# Patient Record
Sex: MALE | Race: WHITE | NOT HISPANIC OR LATINO | Employment: FULL TIME | URBAN - METROPOLITAN AREA
[De-identification: names, ages, dates, MRNs, and addresses within clinical notes are randomized per-mention and may not be internally consistent; named-entity substitution may affect disease eponyms.]

---

## 2023-03-03 LAB — PROSTATE SPECIFIC AG (NG/ML) IN SER/PLAS: 0.43 NG/ML (ref 0–4)

## 2023-09-22 LAB
ALANINE AMINOTRANSFERASE (SGPT) (U/L) IN SER/PLAS: 15 U/L (ref 10–52)
ALBUMIN (G/DL) IN SER/PLAS: 4.9 G/DL (ref 3.4–5)
ALKALINE PHOSPHATASE (U/L) IN SER/PLAS: 60 U/L (ref 33–120)
ANION GAP IN SER/PLAS: 16 MMOL/L (ref 10–20)
ASPARTATE AMINOTRANSFERASE (SGOT) (U/L) IN SER/PLAS: 17 U/L (ref 9–39)
BILIRUBIN TOTAL (MG/DL) IN SER/PLAS: 0.5 MG/DL (ref 0–1.2)
CALCIDIOL (25 OH VITAMIN D3) (NG/ML) IN SER/PLAS: 38 NG/ML
CALCIUM (MG/DL) IN SER/PLAS: 10.3 MG/DL (ref 8.6–10.6)
CARBON DIOXIDE, TOTAL (MMOL/L) IN SER/PLAS: 29 MMOL/L (ref 21–32)
CHLORIDE (MMOL/L) IN SER/PLAS: 103 MMOL/L (ref 98–107)
CREATININE (MG/DL) IN SER/PLAS: 0.92 MG/DL (ref 0.5–1.3)
ESTIMATED AVERAGE GLUCOSE FOR HBA1C: 197 MG/DL
GFR MALE: >90 ML/MIN/1.73M2
GLUCOSE (MG/DL) IN SER/PLAS: 53 MG/DL (ref 74–99)
HEMOGLOBIN A1C/HEMOGLOBIN TOTAL IN BLOOD: 8.5 %
POTASSIUM (MMOL/L) IN SER/PLAS: 4.7 MMOL/L (ref 3.5–5.3)
PROTEIN TOTAL: 8.1 G/DL (ref 6.4–8.2)
SODIUM (MMOL/L) IN SER/PLAS: 143 MMOL/L (ref 136–145)
UREA NITROGEN (MG/DL) IN SER/PLAS: 13 MG/DL (ref 6–23)

## 2024-01-12 ENCOUNTER — LAB (OUTPATIENT)
Dept: LAB | Facility: LAB | Age: 54
End: 2024-01-12
Payer: COMMERCIAL

## 2024-01-12 ENCOUNTER — OFFICE VISIT (OUTPATIENT)
Dept: PRIMARY CARE | Facility: CLINIC | Age: 54
End: 2024-01-12
Payer: COMMERCIAL

## 2024-01-12 VITALS
BODY MASS INDEX: 21.73 KG/M2 | WEIGHT: 155.8 LBS | DIASTOLIC BLOOD PRESSURE: 70 MMHG | OXYGEN SATURATION: 100 % | HEART RATE: 88 BPM | SYSTOLIC BLOOD PRESSURE: 103 MMHG

## 2024-01-12 DIAGNOSIS — Z12.11 COLON CANCER SCREENING: ICD-10-CM

## 2024-01-12 DIAGNOSIS — E10.9 TYPE 1 DIABETES MELLITUS WITHOUT COMPLICATION (MULTI): ICD-10-CM

## 2024-01-12 DIAGNOSIS — E10.9 TYPE 1 DIABETES MELLITUS WITHOUT COMPLICATION (MULTI): Primary | ICD-10-CM

## 2024-01-12 PROCEDURE — 3074F SYST BP LT 130 MM HG: CPT | Performed by: NURSE PRACTITIONER

## 2024-01-12 PROCEDURE — 4004F PT TOBACCO SCREEN RCVD TLK: CPT | Performed by: NURSE PRACTITIONER

## 2024-01-12 PROCEDURE — 83036 HEMOGLOBIN GLYCOSYLATED A1C: CPT

## 2024-01-12 PROCEDURE — 3078F DIAST BP <80 MM HG: CPT | Performed by: NURSE PRACTITIONER

## 2024-01-12 PROCEDURE — 80048 BASIC METABOLIC PNL TOTAL CA: CPT

## 2024-01-12 PROCEDURE — 36415 COLL VENOUS BLD VENIPUNCTURE: CPT

## 2024-01-12 PROCEDURE — 99214 OFFICE O/P EST MOD 30 MIN: CPT | Performed by: NURSE PRACTITIONER

## 2024-01-12 RX ORDER — INSULIN DEGLUDEC 100 U/ML
22 INJECTION, SOLUTION SUBCUTANEOUS EVERY MORNING
COMMUNITY
End: 2024-01-12 | Stop reason: SDUPTHER

## 2024-01-12 RX ORDER — PEN NEEDLE, DIABETIC 31 GX5/16"
NEEDLE, DISPOSABLE MISCELLANEOUS
COMMUNITY
Start: 2023-08-23 | End: 2024-01-12 | Stop reason: SDUPTHER

## 2024-01-12 RX ORDER — INSULIN DEGLUDEC 100 U/ML
22 INJECTION, SOLUTION SUBCUTANEOUS EVERY MORNING
Qty: 30 ML | Refills: 2 | Status: SHIPPED | OUTPATIENT
Start: 2024-01-12 | End: 2025-01-11

## 2024-01-12 RX ORDER — FLASH GLUCOSE SCANNING READER
EACH MISCELLANEOUS
COMMUNITY
Start: 2023-03-03

## 2024-01-12 RX ORDER — FLASH GLUCOSE SENSOR
KIT MISCELLANEOUS
COMMUNITY
Start: 2023-12-09 | End: 2024-01-12 | Stop reason: SDUPTHER

## 2024-01-12 RX ORDER — INSULIN LISPRO 100 [IU]/ML
INJECTION, SOLUTION INTRAVENOUS; SUBCUTANEOUS
COMMUNITY
Start: 2023-09-28 | End: 2024-01-12 | Stop reason: SDUPTHER

## 2024-01-12 RX ORDER — FLASH GLUCOSE SENSOR
KIT MISCELLANEOUS
Qty: 6 EACH | Refills: 3 | Status: SHIPPED | OUTPATIENT
Start: 2024-01-12

## 2024-01-12 RX ORDER — INSULIN LISPRO 100 [IU]/ML
7 INJECTION, SOLUTION INTRAVENOUS; SUBCUTANEOUS
Qty: 30 ML | Refills: 2 | Status: SHIPPED | OUTPATIENT
Start: 2024-01-12 | End: 2025-01-11

## 2024-01-12 RX ORDER — PEN NEEDLE, DIABETIC 31 GX5/16"
NEEDLE, DISPOSABLE MISCELLANEOUS
Qty: 400 EACH | Refills: 3 | Status: SHIPPED | OUTPATIENT
Start: 2024-01-12

## 2024-01-12 ASSESSMENT — ENCOUNTER SYMPTOMS
FREQUENCY: 0
DYSURIA: 0
ARTHRALGIAS: 0
NUMBNESS: 0
SHORTNESS OF BREATH: 0
ABDOMINAL DISTENTION: 0
HEMATURIA: 0
EYE PAIN: 0
COUGH: 0
ACTIVITY CHANGE: 0
APPETITE CHANGE: 0
PALPITATIONS: 0
VOMITING: 0
EYE ITCHING: 0
WOUND: 0
WHEEZING: 0
CONSTIPATION: 0
NERVOUS/ANXIOUS: 0
DIARRHEA: 0
ABDOMINAL PAIN: 0

## 2024-01-12 NOTE — ASSESSMENT & PLAN NOTE
-poor control   -advised to keep routine in mealtimes and insulin dosing.  -Continue Tresiba 22 units daily, advised to take Humalog 3 units Premeal consistently if blood sugar greater than 150 Premeal + ssi  -Advised to take insulin Premeal, avoid chasing postprandial blood sugar results   Glucose monitoring: Claudia 2 CGM- BS in AM 80s, after breakfast spikes x 2 hrs - then drops again.   A1c: 2/8/2023 was 8.7 improvement from June 2022 when it was 9.4  Ophthalmologist: every year dr hendricks at [place]- last visit 8 months ago.   Podiatrist: some neuropathy- feel cold at times. uses diabetic socks. keeps feet protected. Does not follow w podiatrist.     a1c, bmp, today  Hypoglycemia: seldom now. But does occur when he takes the dinner humalog   -reviewed recommendation for acei and statin-- declines at this time

## 2024-01-12 NOTE — PROGRESS NOTES
Chief Complaint  Blood Sugar Problem and Diabetes.    History Of Present Illness  Mitul Theodore is a 53 y.o. male presents today for follow up of Blood Sugar Problem and Diabetes.    Diabetes type 1  -poor control   -advised to keep routine in mealtimes and insulin dosing.  -Continue Tresiba 22 units daily, advised to take Humalog 3 units Premeal consistently if blood sugar greater than 150 Premeal + ssi  -Advised to take insulin Premeal, avoid chasing postprandial blood sugar results   Glucose monitoring: Claudia 2 CGM- BS in AM 80s, after breakfast spikes x 2 hrs - then drops again.   A1c: 2/8/2023 was 8.7 improvement from June 2022 when it was 9.4  Ophthalmologist: every year dr hendricks at [place]- last visit 8 months ago.   Podiatrist: some neuropathy- feel cold at times. uses diabetic socks. keeps feet protected. Does not follow w podiatrist.     a1c, bmp, today  Hypoglycemia: seldom now. But does occur when he takes the dinner humalog     Difficult to obtain clear of how he does insulin at home as he decides insulin usage daily based on glucose values.  At times takes dinner insulin, other times does not.        Review of Systems  Review of Systems   Constitutional:  Negative for activity change and appetite change.   HENT:  Negative for congestion.    Eyes:  Negative for pain and itching.   Respiratory:  Negative for cough, shortness of breath and wheezing.    Cardiovascular:  Negative for chest pain, palpitations and leg swelling.   Gastrointestinal:  Negative for abdominal distention, abdominal pain, constipation, diarrhea and vomiting.   Genitourinary:  Negative for dysuria, frequency, hematuria and urgency.   Musculoskeletal:  Negative for arthralgias and gait problem.   Skin:  Negative for rash and wound.   Neurological:  Negative for numbness.   Psychiatric/Behavioral:  The patient is not nervous/anxious.        Past Medical History  He has a past medical history of Abnormal weight loss (08/23/2013) and  "Acute sinusitis, unspecified (08/23/2013).    Surgical History  He has no past surgical history on file.    Family History  No family history on file.     Social History  He reports that he has been smoking cigarettes. He has been smoking an average of 1 pack per day. He has never used smokeless tobacco. He reports current alcohol use of about 4.0 standard drinks of alcohol per week. He reports that he does not use drugs.    Allergies  Patient has no known allergies.    Medications  Current Outpatient Medications   Medication Instructions    BD Ultra-Fine Li Pen Needle 32 gauge x 5/32\" needle USE 4 A DAY    FreeStyle Claudia 2 [place] misc USE AS DIRECTED    FreeStyle Claudia 2 Sensor kit USE 1 EVERY 14 DAYS    HumaLOG KwikPen Insulin 100 unit/mL injection USES 20 UNITS DAILY OR AMOUNT DIRECTED    Tresiba FlexTouch U-100 100 unit/mL (3 mL) injection Inject 22 Units under the skin once daily in the morning.        Objective   /70   Pulse 88   Wt 70.7 kg (155 lb 12.8 oz)   SpO2 100%   BMI 21.73 kg/m²    BMI: Estimated body mass index is 21.73 kg/m² as calculated from the following:    Height as of 6/7/23: 1.803 m (5' 11\").    Weight as of this encounter: 70.7 kg (155 lb 12.8 oz).    Physical Exam  Physical Exam  Vitals and nursing note reviewed.   Constitutional:       Appearance: Normal appearance.   HENT:      Head: Normocephalic and atraumatic.      Nose: Nose normal.      Mouth/Throat:      Mouth: Mucous membranes are moist.      Pharynx: Oropharynx is clear.   Eyes:      Extraocular Movements: Extraocular movements intact.      Conjunctiva/sclera: Conjunctivae normal.      Pupils: Pupils are equal, round, and reactive to light.   Cardiovascular:      Rate and Rhythm: Normal rate and regular rhythm.      Pulses: Normal pulses.      Heart sounds: Normal heart sounds.   Pulmonary:      Effort: Pulmonary effort is normal.      Breath sounds: Normal breath sounds.   Abdominal:      General: Bowel sounds are " normal.      Palpations: Abdomen is soft.      Tenderness: There is no abdominal tenderness.   Musculoskeletal:         General: Normal range of motion.      Cervical back: Neck supple.   Skin:     General: Skin is warm and dry.   Neurological:      General: No focal deficit present.      Mental Status: He is alert and oriented to person, place, and time. Mental status is at baseline.   Psychiatric:         Mood and Affect: Mood normal.         Behavior: Behavior normal.         Thought Content: Thought content normal.         Judgment: Judgment normal.         Relevant Results and Imaging  Orders Only on 09/22/2023   Component Date Value Ref Range Status    Glucose 09/22/2023 53 (L)  74 - 99 mg/dL Final    Sodium 09/22/2023 143  136 - 145 mmol/L Final    Potassium 09/22/2023 4.7  3.5 - 5.3 mmol/L Final    Chloride 09/22/2023 103  98 - 107 mmol/L Final    Bicarbonate 09/22/2023 29  21 - 32 mmol/L Final    Anion Gap 09/22/2023 16  10 - 20 mmol/L Final    Urea Nitrogen 09/22/2023 13  6 - 23 mg/dL Final    Creatinine 09/22/2023 0.92  0.50 - 1.30 mg/dL Final    GFR MALE 09/22/2023 >90  >90 mL/min/1.73m2 Final    Comment:  CALCULATIONS OF ESTIMATED GFR ARE PERFORMED   USING THE 2021 CKD-EPI STUDY REFIT EQUATION   WITHOUT THE RACE VARIABLE FOR THE IDMS-TRACEABLE   CREATININE METHODS.    https://jasn.asnjournals.org/content/early/2021/09/22/ASN.1963065088      Calcium 09/22/2023 10.3  8.6 - 10.6 mg/dL Final    Albumin 09/22/2023 4.9  3.4 - 5.0 g/dL Final    Alkaline Phosphatase 09/22/2023 60  33 - 120 U/L Final    Total Protein 09/22/2023 8.1  6.4 - 8.2 g/dL Final    AST 09/22/2023 17  9 - 39 U/L Final    Total Bilirubin 09/22/2023 0.5  0.0 - 1.2 mg/dL Final    ALT (SGPT) 09/22/2023 15  10 - 52 U/L Final    Comment:  Patients treated with Sulfasalazine may generate    falsely decreased results for ALT.      Hemoglobin A1C 09/22/2023 8.5 (A)  % Final    Comment:      Diagnosis of Diabetes-Adults   Non-Diabetic: < or =  5.6%   Increased risk for developing diabetes: 5.7-6.4%   Diagnostic of diabetes: > or = 6.5%  .       Monitoring of Diabetes                Age (y)     Therapeutic Goal (%)   Adults:          >18           <7.0   Pediatrics:    13-18           <7.5                   7-12           <8.0                   0- 6            7.5-8.5   American Diabetes Association. Diabetes Care 33(S1), Jan 2010.      Estimated Average Glucose 09/22/2023 197  MG/DL Final    Vitamin D, 25-Hydroxy 09/22/2023 38  ng/mL Final    Comment: .  DEFICIENCY:         < 20   NG/ML  INSUFFICIENCY:      20-29  NG/ML  SUFFICIENCY:         NG/ML    THIS ASSAY ACCURATELY QUANTIFIES THE SUM OF  VITAMIN D3, 25-HYDROXY AND VIT D2,25-HYDROXY.     Orders Only on 03/03/2023   Component Date Value Ref Range Status    PSA 03/03/2023 0.43  0.00 - 4.00 ng/mL Final    Comment: The FDA requires that the method used for PSA assay be   reported to the physician. Values obtained with different   assay methods must not be used interchangeably. This test   was performed at [facility] using the Siemens  Full Circle CRM PSA method, which is a sandwich immunoassay using   chemiluminescence for quantitation. The assay is approved  for measurement of prostate-specific antigen (PSA) in   serum and may be used in conjunction with a digital rectal  examination in men 50 years and older as an aid in   detection of prostate cancer.   5-Alpha-reductase inhibitors (e.g. Proscar, Finasteride,   Avodart, Dutasteride and Angelina) for the treatment of BPH   have been shown to lower PSA levels by an average of 50%   after 6 months of treatment.     [facility] Encounter on 02/08/2023   Component Date Value Ref Range Status    Hemoglobin A1C 02/08/2023 8.7 (A)  % Final    Comment:      Diagnosis of Diabetes-Adults   Non-Diabetic: < or = 5.6%   Increased risk for developing diabetes: 5.7-6.4%   Diagnostic of diabetes: > or = 6.5%  .       Monitoring of Diabetes                Age  (y)     Therapeutic Goal (%)   Adults:          >18           <7.0   Pediatrics:    13-18           <7.5                   7-12           <8.0                   0- 6            7.5-8.5   American Diabetes Association. Diabetes Care 33(S1), Jan 2010.      Estimated Average Glucose 02/08/2023 203  MG/DL Final    Cholesterol 02/08/2023 157  0 - 199 mg/dL Final    Comment: .      AGE      DESIRABLE   BORDERLINE HIGH   HIGH     0-19 Y     0 - 169       170 - 199     >/= 200    20-24 Y     0 - 189       190 - 224     >/= 225         >24 Y     0 - 199       200 - 239     >/= 240   **All ranges are based on fasting samples. Specific   therapeutic targets will vary based on patient-specific   cardiac risk.  .   Pediatric guidelines reference:Pediatrics 2011, 128(S5).   Adult guidelines reference: NCEP ATPIII Guidelines,     CHU 2001, 258:2486-97  .   Venipuncture immediately after or during the    administration of Metamizole may lead to falsely   low results. Testing should be performed immediately   prior to Metamizole dosing.      HDL 02/08/2023 49.6  mg/dL Final    Comment: .      AGE      VERY LOW   LOW     NORMAL    HIGH       0-19 Y       < 35   < 40     40-45     ----    20-24 Y       ----   < 40       >45     ----      >24 Y       ----   < 40     40-60      >60  .      Cholesterol/HDL Ratio 02/08/2023 3.2   Final    Comment: REF VALUES  DESIRABLE  < 3.4  HIGH RISK  > 5.0      LDL 02/08/2023 87  0 - 99 mg/dL Final    Comment: .                           NEAR      BORD      AGE      DESIRABLE  OPTIMAL    HIGH     HIGH     VERY HIGH     0-19 Y     0 - 109     ---    110-129   >/= 130     ----    20-24 Y     0 - 119     ---    120-159   >/= 160     ----      >24 Y     0 -  99   100-129  130-159   160-189     >/=190  .      VLDL 02/08/2023 20  0 - 40 mg/dL Final    Triglycerides 02/08/2023 102  0 - 149 mg/dL Final    Comment: .      AGE      DESIRABLE   BORDERLINE HIGH   HIGH     VERY HIGH   0 D-90 D    19 - 174          ----         ----        ----  91 D- 9 Y     0 -  74        75 -  99     >/= 100      ----    10-19 Y     0 -  89        90 - 129     >/= 130      ----    20-24 Y     0 - 114       115 - 149     >/= 150      ----         >24 Y     0 - 149       150 - 199    200- 499    >/= 500  .   Venipuncture immediately after or during the    administration of Metamizole may lead to falsely   low results. Testing should be performed immediately   prior to Metamizole dosing.      Glucose 02/08/2023 146 (H)  74 - 99 mg/dL Final    Sodium 02/08/2023 139  136 - 145 mmol/L Final    Potassium 02/08/2023 5.2  3.5 - 5.3 mmol/L Final    Chloride 02/08/2023 101  98 - 107 mmol/L Final    Bicarbonate 02/08/2023 32  21 - 32 mmol/L Final    Anion Gap 02/08/2023 11  10 - 20 mmol/L Final    Urea Nitrogen 02/08/2023 14  6 - 23 mg/dL Final    Creatinine 02/08/2023 0.81  0.50 - 1.30 mg/dL Final    GFR MALE 02/08/2023 >90  >90 mL/min/1.73m2 Final    Comment:  CALCULATIONS OF ESTIMATED GFR ARE PERFORMED   USING THE 2021 CKD-EPI STUDY REFIT EQUATION   WITHOUT THE RACE VARIABLE FOR THE IDMS-TRACEABLE   CREATININE METHODS.    https://jasn.asnjournals.org/content/early/2021/09/22/ASN.8875712185      Calcium 02/08/2023 9.5  8.6 - 10.6 mg/dL Final     No images are attached to the encounter.        Assessment and Plan  Assessment/Plan   Problem List Items Addressed This Visit             ICD-10-CM    Type 1 diabetes mellitus (CMS/[facility]) - Primary E10.9     -poor control   -advised to keep routine in mealtimes and insulin dosing.  -Continue Tresiba 22 units daily, advised to take Humalog 3 units Premeal consistently if blood sugar greater than 150 Premeal + ssi  -Advised to take insulin Premeal, avoid chasing postprandial blood sugar results   Glucose monitoring: Claudia 2 CGM- BS in AM 80s, after breakfast spikes x 2 hrs - then drops again.   A1c: 2/8/2023 was 8.7 improvement from June 2022 when it was 9.4  Ophthalmologist: every year dr hendricks at [place]-  last visit 8 months ago.   Podiatrist: some neuropathy- feel cold at times. uses diabetic socks. keeps feet protected. Does not follow w podiatrist.     a1c, bmp, today  Hypoglycemia: seldom now. But does occur when he takes the dinner humalog

## 2024-01-13 LAB
ANION GAP SERPL CALC-SCNC: 12 MMOL/L (ref 10–20)
BUN SERPL-MCNC: 13 MG/DL (ref 6–23)
CALCIUM SERPL-MCNC: 9.7 MG/DL (ref 8.6–10.6)
CHLORIDE SERPL-SCNC: 102 MMOL/L (ref 98–107)
CO2 SERPL-SCNC: 32 MMOL/L (ref 21–32)
CREAT SERPL-MCNC: 0.85 MG/DL (ref 0.5–1.3)
EGFRCR SERPLBLD CKD-EPI 2021: >90 ML/MIN/1.73M*2
EST. AVERAGE GLUCOSE BLD GHB EST-MCNC: 200 MG/DL
GLUCOSE SERPL-MCNC: 213 MG/DL (ref 74–99)
HBA1C MFR BLD: 8.6 %
POTASSIUM SERPL-SCNC: 4.5 MMOL/L (ref 3.5–5.3)
SODIUM SERPL-SCNC: 141 MMOL/L (ref 136–145)

## 2024-01-19 ENCOUNTER — APPOINTMENT (OUTPATIENT)
Dept: PRIMARY CARE | Facility: CLINIC | Age: 54
End: 2024-01-19
Payer: COMMERCIAL

## 2024-02-04 LAB — NONINV COLON CA DNA+OCC BLD SCRN STL QL: NEGATIVE

## 2024-05-10 ENCOUNTER — APPOINTMENT (OUTPATIENT)
Dept: PRIMARY CARE | Facility: CLINIC | Age: 54
End: 2024-05-10
Payer: COMMERCIAL

## 2024-05-10 ENCOUNTER — OFFICE VISIT (OUTPATIENT)
Dept: PRIMARY CARE | Facility: CLINIC | Age: 54
End: 2024-05-10
Payer: COMMERCIAL

## 2024-05-10 ENCOUNTER — LAB (OUTPATIENT)
Dept: LAB | Facility: LAB | Age: 54
End: 2024-05-10
Payer: COMMERCIAL

## 2024-05-10 VITALS
DIASTOLIC BLOOD PRESSURE: 74 MMHG | TEMPERATURE: 97.7 F | OXYGEN SATURATION: 97 % | HEIGHT: 71 IN | BODY MASS INDEX: 21.42 KG/M2 | WEIGHT: 153 LBS | SYSTOLIC BLOOD PRESSURE: 106 MMHG | HEART RATE: 103 BPM

## 2024-05-10 DIAGNOSIS — E10.9 TYPE 1 DIABETES MELLITUS WITHOUT COMPLICATION (MULTI): ICD-10-CM

## 2024-05-10 LAB
ANION GAP SERPL CALC-SCNC: 12 MMOL/L (ref 10–20)
BUN SERPL-MCNC: 15 MG/DL (ref 6–23)
CALCIUM SERPL-MCNC: 9.7 MG/DL (ref 8.6–10.6)
CHLORIDE SERPL-SCNC: 101 MMOL/L (ref 98–107)
CO2 SERPL-SCNC: 29 MMOL/L (ref 21–32)
CREAT SERPL-MCNC: 0.93 MG/DL (ref 0.5–1.3)
CREAT UR-MCNC: 87.5 MG/DL (ref 20–370)
EGFRCR SERPLBLD CKD-EPI 2021: >90 ML/MIN/1.73M*2
EST. AVERAGE GLUCOSE BLD GHB EST-MCNC: 197 MG/DL
GLUCOSE SERPL-MCNC: 178 MG/DL (ref 74–99)
HBA1C MFR BLD: 8.5 %
MICROALBUMIN UR-MCNC: 8.8 MG/L
MICROALBUMIN/CREAT UR: 10.1 UG/MG CREAT
POTASSIUM SERPL-SCNC: 5.3 MMOL/L (ref 3.5–5.3)
SODIUM SERPL-SCNC: 137 MMOL/L (ref 136–145)

## 2024-05-10 PROCEDURE — 3052F HG A1C>EQUAL 8.0%<EQUAL 9.0%: CPT | Performed by: NURSE PRACTITIONER

## 2024-05-10 PROCEDURE — 3074F SYST BP LT 130 MM HG: CPT | Performed by: NURSE PRACTITIONER

## 2024-05-10 PROCEDURE — 82570 ASSAY OF URINE CREATININE: CPT

## 2024-05-10 PROCEDURE — 80048 BASIC METABOLIC PNL TOTAL CA: CPT

## 2024-05-10 PROCEDURE — 83036 HEMOGLOBIN GLYCOSYLATED A1C: CPT

## 2024-05-10 PROCEDURE — 3078F DIAST BP <80 MM HG: CPT | Performed by: NURSE PRACTITIONER

## 2024-05-10 PROCEDURE — 36415 COLL VENOUS BLD VENIPUNCTURE: CPT

## 2024-05-10 PROCEDURE — 99214 OFFICE O/P EST MOD 30 MIN: CPT | Performed by: NURSE PRACTITIONER

## 2024-05-10 PROCEDURE — 82043 UR ALBUMIN QUANTITATIVE: CPT

## 2024-05-10 ASSESSMENT — ENCOUNTER SYMPTOMS
ACTIVITY CHANGE: 0
VOMITING: 0
SHORTNESS OF BREATH: 0
EYE ITCHING: 0
DYSURIA: 0
FREQUENCY: 0
APPETITE CHANGE: 0
EYE PAIN: 0
HEMATURIA: 0
ABDOMINAL DISTENTION: 0
ARTHRALGIAS: 0
CONSTIPATION: 0
PALPITATIONS: 0
WHEEZING: 0
WOUND: 0
NUMBNESS: 0
ABDOMINAL PAIN: 0
NERVOUS/ANXIOUS: 0
DIARRHEA: 0
COUGH: 0

## 2024-05-10 ASSESSMENT — PATIENT HEALTH QUESTIONNAIRE - PHQ9
1. LITTLE INTEREST OR PLEASURE IN DOING THINGS: NOT AT ALL
2. FEELING DOWN, DEPRESSED OR HOPELESS: NOT AT ALL
2. FEELING DOWN, DEPRESSED OR HOPELESS: NOT AT ALL
1. LITTLE INTEREST OR PLEASURE IN DOING THINGS: NOT AT ALL
SUM OF ALL RESPONSES TO PHQ9 QUESTIONS 1 AND 2: 0
SUM OF ALL RESPONSES TO PHQ9 QUESTIONS 1 AND 2: 0

## 2024-05-10 NOTE — ASSESSMENT & PLAN NOTE
-poor control   -advised to keep routine in mealtimes and insulin dosing.  -Continue Tresiba 22 units daily, advised to take Humalog 3 units Premeal consistently if blood sugar greater than 150 Premeal + ssi.  -Advised to take insulin Premeal, avoid chasing postprandial blood sugar results   Glucose monitoring: Claudia 2 CGM-  Time in target:  <70 2%,   54%, 181-11917%,, >240 17%  A1c: 1/2024was 8.6 improvement from June 2022 when it was 9.4  Ophthalmologist: every year [facility]   Podiatrist: some neuropathy- feel cold at times. uses diabetic socks. keeps feet protected. Does not follow w podiatrist.     a1c, bmp, today  Hypoglycemia: seldom now. But does occur when he takes the dinner humalog   -reviewed recommendation for acei and statin-- declines at this time

## 2024-05-10 NOTE — PROGRESS NOTES
Chief Complaint  Follow-up (Follow-Up DM).    History Of Present Illness  Mitul Theodore is a 54 y.o. male presents today for follow up of Follow-up (Follow-Up DM).    Last A1c above target-1/2024 8.6.  Tried the suggested increase to 24 units per day but was experiencing hypoglycemic events.  Returned to 22u  daily.   3u premeal except if BS is 120 or lower.  Doesn't take or takes later (after meal). Time in target:  <70 2%,   54%, 181-77735%,, >240 17%  Due for eye exam.    Does not take statin or ACEi-- provided info and encouraged to look up/read about it prior to next visit- at this time still declines meds. States tried low dose ACEi in past and experienced hypotension.      Review of Systems  Review of Systems   Constitutional:  Negative for activity change and appetite change.   HENT:  Negative for congestion.    Eyes:  Negative for pain and itching.   Respiratory:  Negative for cough, shortness of breath and wheezing.    Cardiovascular:  Negative for chest pain, palpitations and leg swelling.   Gastrointestinal:  Negative for abdominal distention, abdominal pain, constipation, diarrhea and vomiting.   Genitourinary:  Negative for dysuria, frequency, hematuria and urgency.   Musculoskeletal:  Negative for arthralgias and gait problem.   Skin:  Negative for rash and wound.   Neurological:  Negative for numbness.   Psychiatric/Behavioral:  The patient is not nervous/anxious.        Past Medical History  He has a past medical history of Abnormal weight loss (08/23/2013) and Acute sinusitis, unspecified (08/23/2013).    Surgical History  He has no past surgical history on file.    Family History  No family history on file.     Social History  He reports that he has been smoking cigarettes. He has never used smokeless tobacco. He reports current alcohol use of about 4.0 standard drinks of alcohol per week. He reports that he does not use drugs.    DEPRESSION SCREEN  Over the past 2 weeks, how often have  "you been bothered by any of the following problems?  Little interest or pleasure in doing things: Not at all  Feeling down, depressed, or hopeless: Not at all      Allergies  Patient has no known allergies.    Medications  Current Outpatient Medications   Medication Instructions    BD Ultra-Fine Li Pen Needle 32 gauge x 5/32\" needle USE 4 A DAY    FreeStyle Claudia 2 [place] misc USE AS DIRECTED    FreeStyle Claudia 2 Sensor kit USE 1 EVERY 14 DAYS    HumaLOG KwikPen Insulin 7 Units, subcutaneous, 3 times daily with meals, Take as directed per insulin instructions.    Tresiba FlexTouch U-100 22 Units, subcutaneous, Every morning        Objective   /74 (BP Location: Left arm, Patient Position: Sitting, BP Cuff Size: Adult)   Pulse 103   Temp 36.5 °C (97.7 °F) (Temporal)   Ht 1.803 m (5' 11\")   Wt 69.4 kg (153 lb)   SpO2 97%   BMI 21.34 kg/m²    BMI: Estimated body mass index is 21.34 kg/m² as calculated from the following:    Height as of this encounter: 1.803 m (5' 11\").    Weight as of this encounter: 69.4 kg (153 lb).    Physical Exam  Physical Exam  Vitals and nursing note reviewed.   Constitutional:       Appearance: Normal appearance.   HENT:      Head: Normocephalic and atraumatic.      Nose: Nose normal.      Mouth/Throat:      Mouth: Mucous membranes are moist.      Pharynx: Oropharynx is clear.   Eyes:      Extraocular Movements: Extraocular movements intact.      Conjunctiva/sclera: Conjunctivae normal.      Pupils: Pupils are equal, round, and reactive to light.   Cardiovascular:      Rate and Rhythm: Normal rate and regular rhythm.      Pulses: Normal pulses.      Heart sounds: Normal heart sounds.   Pulmonary:      Effort: Pulmonary effort is normal.      Breath sounds: Normal breath sounds.   Abdominal:      General: Bowel sounds are normal.      Palpations: Abdomen is soft.      Tenderness: There is no abdominal tenderness.   Musculoskeletal:         General: Normal range of motion.      " Cervical back: Neck supple.   Skin:     General: Skin is warm and dry.   Neurological:      General: No focal deficit present.      Mental Status: He is alert and oriented to person, place, and time. Mental status is at baseline.   Psychiatric:         Mood and Affect: Mood normal.         Behavior: Behavior normal.         Thought Content: Thought content normal.         Judgment: Judgment normal.         Relevant Results and Imaging  Lab on 01/12/2024   Component Date Value Ref Range Status    Hemoglobin A1C 01/12/2024 8.6 (H)  see below % Final    Estimated Average Glucose 01/12/2024 200  Not Established mg/dL Final    Glucose 01/12/2024 213 (H)  74 - 99 mg/dL Final    Sodium 01/12/2024 141  136 - 145 mmol/L Final    Potassium 01/12/2024 4.5  3.5 - 5.3 mmol/L Final    Chloride 01/12/2024 102  98 - 107 mmol/L Final    Bicarbonate 01/12/2024 32  21 - 32 mmol/L Final    Anion Gap 01/12/2024 12  10 - 20 mmol/L Final    Urea Nitrogen 01/12/2024 13  6 - 23 mg/dL Final    Creatinine 01/12/2024 0.85  0.50 - 1.30 mg/dL Final    eGFR 01/12/2024 >90  >60 mL/min/1.73m*2 Final    Calculations of estimated GFR are performed using the 2021 CKD-EPI Study Refit equation without the race variable for the IDMS-Traceable creatinine methods.  https://jasn.asnjournals.org/content/early/2021/09/22/ASN.0552160463    Calcium 01/12/2024 9.7  8.6 - 10.6 mg/dL Final   Office Visit on 01/12/2024   Component Date Value Ref Range Status    NONINV COLON CA DNA+OCC BLD SCRN S* 01/27/2024 Negative  Negative Final    Comment:   NEGATIVE TEST RESULT. A negative Cologuard result indicates a low likelihood that a colorectal cancer (CRC) or advanced adenoma (adenomatous polyps with more advanced pre-malignant features)  is present. The chance that a person with a negative Cologuard test has a colorectal cancer is less than 1 in 1500 (negative predictive value >99.9%) or has an  advanced adenoma is less than  5.3% (negative predictive value 94.7%).  These data are based on a prospective cross-sectional study of 10,000 individuals at average risk for colorectal cancer who were screened with both Cologuard and colonoscopy. (Hailey Kincaid al, N Engl J Med 2014;370(14):1943-4971) The normal value (reference range) for this assay is negative.    COLOGUARD RE-SCREENING RECOMMENDATION: Periodic colorectal cancer screening is an important part of preventive healthcare for asymptomatic individuals at average risk for colorectal cancer.  Following a negative Cologuard result, the American Cancer Society and U.S.                            Multi-Society Task Force screening guidelines recommend a Cologuard re-screening interval of 3 years.   References: American Cancer Society Guideline for Colorectal Cancer Screening: https://www.cancer.org/cancer/colon-rectal-cancer/mijkxlqxa-sgqerzpiw-wnuuvvs/acs-recommendations.html.; Lowell GRAY, Kim ROSSI, Harvey FARRK, Colorectal Cancer Screening: Recommendations for Physicians and Patients from the U.S. Multi-Society Task Force on Colorectal Cancer Screening , Am J Gastroenterology 2017; 112:4808-7412.    TEST DESCRIPTION: Composite algorithmic analysis of stool DNA-biomarkers with hemoglobin immunoassay.   Quantitative values of individual biomarkers are not reportable and are not associated with individual biomarker result reference ranges. Cologuard is intended for colorectal cancer screening of adults of either sex, 45 years or older, who are at average-risk for colorectal cancer (CRC). Cologuard has been approved for use by the U.S. FDA. The performance of Cologuard was                            established in a cross sectional study of average-risk adults aged 50-84. Cologuard performance in patients ages 45 to 49 years was estimated by sub-group analysis of near-age groups. Colonoscopies performed for a positive result may find as the most clinically significant lesion: colorectal cancer [4.0%], advanced adenoma (including  sessile serrated polyps greater than or equal to 1cm diameter) [20%] or non- advanced adenoma [31%]; or no colorectal neoplasia [45%]. These estimates are derived from a prospective cross-sectional screening study of 10,000 individuals at average risk for colorectal cancer who were screened with both Cologuard and colonoscopy. (Hailey Kincaid al, N Engl J Med 2014;370(14):0271-1859.) Cologuard may produce a false negative or false positive result (no colorectal cancer or precancerous polyp present at colonoscopy follow up). A negative Cologuard test result does not guarantee the absence of CRC or advanced adenoma (pre-cancer). The current Cologuard                            screening interval is every 3 years. (American Cancer Society and U.S. Multi-Society Task Force). Cologuard performance data in a 10,000 patient pivotal study using colonoscopy as the reference method can be accessed at the following location: [email or web address]/results. Additional description of the Cologuard test process, warnings and precautions can be found at www.cologuard.com.     Orders Only on 09/22/2023   Component Date Value Ref Range Status    Glucose 09/22/2023 53 (L)  74 - 99 mg/dL Final    Sodium 09/22/2023 143  136 - 145 mmol/L Final    Potassium 09/22/2023 4.7  3.5 - 5.3 mmol/L Final    Chloride 09/22/2023 103  98 - 107 mmol/L Final    Bicarbonate 09/22/2023 29  21 - 32 mmol/L Final    Anion Gap 09/22/2023 16  10 - 20 mmol/L Final    Urea Nitrogen 09/22/2023 13  6 - 23 mg/dL Final    Creatinine 09/22/2023 0.92  0.50 - 1.30 mg/dL Final    GFR MALE 09/22/2023 >90  >90 mL/min/1.73m2 Final    Comment:  CALCULATIONS OF ESTIMATED GFR ARE PERFORMED   USING THE 2021 CKD-EPI STUDY REFIT EQUATION   WITHOUT THE RACE VARIABLE FOR THE IDMS-TRACEABLE   CREATININE METHODS.    https://jasn.asnjournals.org/content/early/2021/09/22/ASN.1536706858      Calcium 09/22/2023 10.3  8.6 - 10.6 mg/dL Final    Albumin 09/22/2023 4.9  3.4 - 5.0 g/dL Final     Alkaline Phosphatase 09/22/2023 60  33 - 120 U/L Final    Total Protein 09/22/2023 8.1  6.4 - 8.2 g/dL Final    AST 09/22/2023 17  9 - 39 U/L Final    Total Bilirubin 09/22/2023 0.5  0.0 - 1.2 mg/dL Final    ALT (SGPT) 09/22/2023 15  10 - 52 U/L Final    Comment:  Patients treated with Sulfasalazine may generate    falsely decreased results for ALT.      Hemoglobin A1C 09/22/2023 8.5 (A)  % Final    Comment:      Diagnosis of Diabetes-Adults   Non-Diabetic: < or = 5.6%   Increased risk for developing diabetes: 5.7-6.4%   Diagnostic of diabetes: > or = 6.5%  .       Monitoring of Diabetes                Age (y)     Therapeutic Goal (%)   Adults:          >18           <7.0   Pediatrics:    13-18           <7.5                   7-12           <8.0                   0- 6            7.5-8.5   American Diabetes Association. Diabetes Care 33(S1), Jan 2010.      Estimated Average Glucose 09/22/2023 197  MG/DL Final    Vitamin D, 25-Hydroxy 09/22/2023 38  ng/mL Final    Comment: .  DEFICIENCY:         < 20   NG/ML  INSUFFICIENCY:      20-29  NG/ML  SUFFICIENCY:         NG/ML    THIS ASSAY ACCURATELY QUANTIFIES THE SUM OF  VITAMIN D3, 25-HYDROXY AND VIT D2,25-HYDROXY.       No images are attached to the encounter.        Assessment and Plan  Assessment/Plan   Problem List Items Addressed This Visit             ICD-10-CM    Type 1 diabetes mellitus (Multi) E10.9     -poor control   -advised to keep routine in mealtimes and insulin dosing.  -Continue Tresiba 22 units daily, advised to take Humalog 3 units Premeal consistently if blood sugar greater than 150 Premeal + ssi.  -Advised to take insulin Premeal, avoid chasing postprandial blood sugar results   Glucose monitoring: Claudia 2 CGM-  Time in target:  <70 2%,   54%, 181-45309%,, >240 17%  A1c: 1/2024was 8.6 improvement from June 2022 when it was 9.4  Ophthalmologist: every year dr hendricks at [place]- appt June/July 2024   Podiatrist: some neuropathy- feel  cold at times. uses diabetic socks. keeps feet protected. Does not follow w podiatrist.     a1c, bmp, today  Hypoglycemia: seldom now. But does occur when he takes the dinner humalog   -reviewed recommendation for acei and statin-- declines at this time          Relevant Orders    Hemoglobin A1C    Basic metabolic panel

## 2024-08-27 PROBLEM — E10.3299: Status: ACTIVE | Noted: 2024-01-12

## 2024-10-24 ENCOUNTER — LAB (OUTPATIENT)
Dept: LAB | Facility: LAB | Age: 54
End: 2024-10-24
Payer: COMMERCIAL

## 2024-10-24 ENCOUNTER — APPOINTMENT (OUTPATIENT)
Dept: PRIMARY CARE | Facility: CLINIC | Age: 54
End: 2024-10-24
Payer: COMMERCIAL

## 2024-10-24 VITALS
HEIGHT: 71 IN | BODY MASS INDEX: 21.5 KG/M2 | TEMPERATURE: 97.2 F | SYSTOLIC BLOOD PRESSURE: 86 MMHG | HEART RATE: 97 BPM | OXYGEN SATURATION: 97 % | WEIGHT: 153.6 LBS | DIASTOLIC BLOOD PRESSURE: 61 MMHG

## 2024-10-24 DIAGNOSIS — Z12.5 PROSTATE CANCER SCREENING: Primary | ICD-10-CM

## 2024-10-24 DIAGNOSIS — E27.49: ICD-10-CM

## 2024-10-24 DIAGNOSIS — E10.3299 TYPE 1 DIABETES MELLITUS WITH MILD NONPROLIFERATIVE RETINOPATHY WITHOUT MACULAR EDEMA, UNSPECIFIED LATERALITY: ICD-10-CM

## 2024-10-24 DIAGNOSIS — Z12.5 PROSTATE CANCER SCREENING: ICD-10-CM

## 2024-10-24 DIAGNOSIS — Z53.20 STATIN DECLINED: ICD-10-CM

## 2024-10-24 PROBLEM — M25.642 STIFFNESS OF LEFT HAND, NOT ELSEWHERE CLASSIFIED: Status: RESOLVED | Noted: 2024-07-18 | Resolved: 2024-10-24

## 2024-10-24 PROBLEM — S66.812S: Status: RESOLVED | Noted: 2024-07-18 | Resolved: 2024-10-24

## 2024-10-24 LAB
ALBUMIN SERPL BCP-MCNC: 4.3 G/DL (ref 3.4–5)
ALP SERPL-CCNC: 63 U/L (ref 33–120)
ALT SERPL W P-5'-P-CCNC: 13 U/L (ref 10–52)
ANION GAP SERPL CALC-SCNC: 12 MMOL/L (ref 10–20)
AST SERPL W P-5'-P-CCNC: 16 U/L (ref 9–39)
BASOPHILS # BLD AUTO: 0.09 X10*3/UL (ref 0–0.1)
BASOPHILS NFR BLD AUTO: 1 %
BILIRUB SERPL-MCNC: 0.4 MG/DL (ref 0–1.2)
BUN SERPL-MCNC: 13 MG/DL (ref 6–23)
CALCIUM SERPL-MCNC: 9.8 MG/DL (ref 8.6–10.6)
CHLORIDE SERPL-SCNC: 101 MMOL/L (ref 98–107)
CHOLEST SERPL-MCNC: 145 MG/DL (ref 0–199)
CHOLESTEROL/HDL RATIO: 3
CO2 SERPL-SCNC: 31 MMOL/L (ref 21–32)
CREAT SERPL-MCNC: 0.85 MG/DL (ref 0.5–1.3)
EGFRCR SERPLBLD CKD-EPI 2021: >90 ML/MIN/1.73M*2
EOSINOPHIL # BLD AUTO: 0.32 X10*3/UL (ref 0–0.7)
EOSINOPHIL NFR BLD AUTO: 3.5 %
ERYTHROCYTE [DISTWIDTH] IN BLOOD BY AUTOMATED COUNT: 12.2 % (ref 11.5–14.5)
EST. AVERAGE GLUCOSE BLD GHB EST-MCNC: 197 MG/DL
GLUCOSE SERPL-MCNC: 172 MG/DL (ref 74–99)
HBA1C MFR BLD: 8.5 %
HCT VFR BLD AUTO: 48 % (ref 41–52)
HDLC SERPL-MCNC: 49.1 MG/DL
HGB BLD-MCNC: 16.6 G/DL (ref 13.5–17.5)
IMM GRANULOCYTES # BLD AUTO: 0.01 X10*3/UL (ref 0–0.7)
IMM GRANULOCYTES NFR BLD AUTO: 0.1 % (ref 0–0.9)
LDLC SERPL CALC-MCNC: 83 MG/DL
LYMPHOCYTES # BLD AUTO: 2.9 X10*3/UL (ref 1.2–4.8)
LYMPHOCYTES NFR BLD AUTO: 31.5 %
MCH RBC QN AUTO: 32.6 PG (ref 26–34)
MCHC RBC AUTO-ENTMCNC: 34.6 G/DL (ref 32–36)
MCV RBC AUTO: 94 FL (ref 80–100)
MONOCYTES # BLD AUTO: 0.69 X10*3/UL (ref 0.1–1)
MONOCYTES NFR BLD AUTO: 7.5 %
NEUTROPHILS # BLD AUTO: 5.2 X10*3/UL (ref 1.2–7.7)
NEUTROPHILS NFR BLD AUTO: 56.4 %
NON HDL CHOLESTEROL: 96 MG/DL (ref 0–149)
NRBC BLD-RTO: 0 /100 WBCS (ref 0–0)
PLATELET # BLD AUTO: 299 X10*3/UL (ref 150–450)
POTASSIUM SERPL-SCNC: 5.4 MMOL/L (ref 3.5–5.3)
PROT SERPL-MCNC: 7.1 G/DL (ref 6.4–8.2)
PSA SERPL-MCNC: 0.47 NG/ML
RBC # BLD AUTO: 5.09 X10*6/UL (ref 4.5–5.9)
SODIUM SERPL-SCNC: 139 MMOL/L (ref 136–145)
TRIGL SERPL-MCNC: 67 MG/DL (ref 0–149)
VLDL: 13 MG/DL (ref 0–40)
WBC # BLD AUTO: 9.2 X10*3/UL (ref 4.4–11.3)

## 2024-10-24 PROCEDURE — 80053 COMPREHEN METABOLIC PANEL: CPT

## 2024-10-24 PROCEDURE — 84153 ASSAY OF PSA TOTAL: CPT

## 2024-10-24 PROCEDURE — 36415 COLL VENOUS BLD VENIPUNCTURE: CPT

## 2024-10-24 PROCEDURE — 99213 OFFICE O/P EST LOW 20 MIN: CPT | Performed by: INTERNAL MEDICINE

## 2024-10-24 PROCEDURE — 80061 LIPID PANEL: CPT

## 2024-10-24 PROCEDURE — 3074F SYST BP LT 130 MM HG: CPT | Performed by: INTERNAL MEDICINE

## 2024-10-24 PROCEDURE — 85025 COMPLETE CBC W/AUTO DIFF WBC: CPT

## 2024-10-24 PROCEDURE — 4004F PT TOBACCO SCREEN RCVD TLK: CPT | Performed by: INTERNAL MEDICINE

## 2024-10-24 PROCEDURE — 3078F DIAST BP <80 MM HG: CPT | Performed by: INTERNAL MEDICINE

## 2024-10-24 PROCEDURE — 3008F BODY MASS INDEX DOCD: CPT | Performed by: INTERNAL MEDICINE

## 2024-10-24 PROCEDURE — 3052F HG A1C>EQUAL 8.0%<EQUAL 9.0%: CPT | Performed by: INTERNAL MEDICINE

## 2024-10-24 PROCEDURE — 83036 HEMOGLOBIN GLYCOSYLATED A1C: CPT

## 2024-10-24 PROCEDURE — 3061F NEG MICROALBUMINURIA REV: CPT | Performed by: INTERNAL MEDICINE

## 2024-10-24 ASSESSMENT — ENCOUNTER SYMPTOMS
ARTHRALGIAS: 0
DYSURIA: 0
SHORTNESS OF BREATH: 0
HEMATURIA: 0
ACTIVITY CHANGE: 0
APPETITE CHANGE: 0
CONSTIPATION: 0
COUGH: 0
ABDOMINAL PAIN: 0
PALPITATIONS: 0
DIARRHEA: 0
VOMITING: 0
WOUND: 0
NUMBNESS: 0
ABDOMINAL DISTENTION: 0
WHEEZING: 0
FREQUENCY: 0
NERVOUS/ANXIOUS: 0

## 2024-10-24 ASSESSMENT — PATIENT HEALTH QUESTIONNAIRE - PHQ9
1. LITTLE INTEREST OR PLEASURE IN DOING THINGS: NOT AT ALL
2. FEELING DOWN, DEPRESSED OR HOPELESS: NOT AT ALL
SUM OF ALL RESPONSES TO PHQ9 QUESTIONS 1 AND 2: 0

## 2024-10-24 NOTE — PROGRESS NOTES
"CHIEF COMPLAINT  [facility] Care and Diabetes    HISTORY OF PRESENT ILLNESS  Mitul Theodore is a 54 y.o. male presents today for follow up of [facility] Care and Diabetes    HPI    History reviewed and updated.   Type 1 diabetic since age 12.  He manages with injections and Freestyle Claudia CGM.  He is prone to hypoglycemia, and thus doses himself accordingly, using less insulin when he plans to be more active.  States he follows with ophthalmologist and has had some mild retinopathy, but otherwise no complications.  BP is on low side - tried ACE inhibitor in past, however BP was then too low.  Has never been on statin.     REVIEW OF SYSTEMS  Review of Systems   Constitutional:  Negative for activity change and appetite change.   Respiratory:  Negative for cough, shortness of breath and wheezing.    Cardiovascular:  Negative for chest pain, palpitations and leg swelling.   Gastrointestinal:  Negative for abdominal distention, abdominal pain, constipation, diarrhea and vomiting.   Genitourinary:  Negative for dysuria, frequency, hematuria and urgency.   Musculoskeletal:  Negative for arthralgias and gait problem.   Skin:  Negative for rash and wound.   Neurological:  Negative for numbness.   Psychiatric/Behavioral:  The patient is not nervous/anxious.        ALLERGIES  Patient has no known allergies.    MEDICATIONS  Current Outpatient Medications   Medication Instructions    BD Ultra-Fine Li Pen Needle 32 gauge x 5/32\" needle USE 4 A DAY    FreeStyle Claudia 2 [place] misc USE AS DIRECTED    FreeStyle Claudia 2 Sensor kit USE 1 EVERY 14 DAYS    HumaLOG KwikPen Insulin 7 Units, subcutaneous, 3 times daily (morning, midday, late afternoon), Take as directed per insulin instructions.    Tresiba FlexTouch U-100 22 Units, subcutaneous, Every morning       TOBACCO USE  Social History     Tobacco Use   Smoking Status Every Day    Current packs/day: 1.00    Types: Cigarettes   Smokeless Tobacco Never       DEPRESSION SCREEN  Over " "the past 2 weeks, how often have you been bothered by any of the following problems?  Little interest or pleasure in doing things: Not at all  Feeling down, depressed, or hopeless: Not at all    SURGICAL HISTORY  No past surgical history on file.       OBJECTIVE    BP 86/61   Pulse 97   Temp 36.2 °C (97.2 °F)   Ht 1.803 m (5' 11\")   Wt 69.7 kg (153 lb 9.6 oz)   SpO2 97%   BMI 21.42 kg/m²    BMI: Estimated body mass index is 21.42 kg/m² as calculated from the following:    Height as of this encounter: 1.803 m (5' 11\").    Weight as of this encounter: 69.7 kg (153 lb 9.6 oz).    BP Readings from Last 3 Encounters:   10/24/24 86/61   05/10/24 106/74   01/12/24 103/70      Wt Readings from Last 3 Encounters:   10/24/24 69.7 kg (153 lb 9.6 oz)   05/10/24 69.4 kg (153 lb)   01/12/24 70.7 kg (155 lb 12.8 oz)        PHYSICAL EXAM  Physical Exam  Vitals and nursing note reviewed.   Constitutional:       Appearance: Normal appearance.   HENT:      Head: Normocephalic and atraumatic.   Eyes:      Extraocular Movements: Extraocular movements intact.      Conjunctiva/sclera: Conjunctivae normal.   Cardiovascular:      Rate and Rhythm: Normal rate and regular rhythm.      Pulses: Normal pulses.      Heart sounds: Normal heart sounds. No murmur heard.  Pulmonary:      Effort: Pulmonary effort is normal. No respiratory distress.      Breath sounds: Normal breath sounds. No wheezing.   Abdominal:      General: Bowel sounds are normal.      Palpations: Abdomen is soft.      Tenderness: There is no abdominal tenderness.   Musculoskeletal:         General: Normal range of motion.      Right lower leg: No edema.      Left lower leg: No edema.   Skin:     General: Skin is warm and dry.   Neurological:      General: No focal deficit present.      Mental Status: He is alert and oriented to person, place, and time. Mental status is at baseline.   Psychiatric:         Mood and Affect: Mood normal.         Behavior: Behavior normal.    "      Thought Content: Thought content normal.         Judgment: Judgment normal.          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  He is generally good about managing on his own.  He is not interested in an insulin pump.  Continue current management.  Check fasting labs.  - BP is low, does not tolerate ACE inhibitor / ARB  - statin declined.    Prostate cancer screening - PSA ordered.

## 2024-11-15 ENCOUNTER — APPOINTMENT (OUTPATIENT)
Dept: PRIMARY CARE | Facility: CLINIC | Age: 54
End: 2024-11-15
Payer: COMMERCIAL

## 2024-12-05 DIAGNOSIS — Z53.20 STATIN DECLINED: Primary | ICD-10-CM

## 2025-01-15 ENCOUNTER — TELEPHONE (OUTPATIENT)
Dept: PRIMARY CARE | Facility: CLINIC | Age: 55
End: 2025-01-15
Payer: COMMERCIAL

## 2025-01-15 DIAGNOSIS — J01.90 ACUTE NON-RECURRENT SINUSITIS, UNSPECIFIED LOCATION: Primary | ICD-10-CM

## 2025-01-15 RX ORDER — AMOXICILLIN AND CLAVULANATE POTASSIUM 875; 125 MG/1; MG/1
875 TABLET, FILM COATED ORAL 2 TIMES DAILY
Qty: 20 TABLET | Refills: 0 | Status: SHIPPED | OUTPATIENT
Start: 2025-01-15 | End: 2025-01-25

## 2025-01-15 NOTE — TELEPHONE ENCOUNTER
Patient called asking to schedule an appt with Dr. Adkins.  No appts available. Patient states he had a fever x 2 days, has a lot of mucus from nose and down throat, and no fever today.  Patient tested negative for covid yesterday.  Patient thinks he has a sinus infection.  Patient traveling for work next week.    Patient [phone]    -   [facility] #[id number] - [address] AT [address] [address]  Phone: [phone] Fax: [phone]

## 2025-01-27 ENCOUNTER — APPOINTMENT (OUTPATIENT)
Dept: PRIMARY CARE | Facility: CLINIC | Age: 55
End: 2025-01-27
Payer: COMMERCIAL

## 2025-01-27 VITALS
WEIGHT: 150.7 LBS | BODY MASS INDEX: 21.1 KG/M2 | DIASTOLIC BLOOD PRESSURE: 71 MMHG | TEMPERATURE: 97.2 F | OXYGEN SATURATION: 99 % | HEART RATE: 98 BPM | HEIGHT: 71 IN | SYSTOLIC BLOOD PRESSURE: 114 MMHG

## 2025-01-27 DIAGNOSIS — Z53.20 STATIN DECLINED: ICD-10-CM

## 2025-01-27 DIAGNOSIS — Z12.2 SCREENING FOR LUNG CANCER: ICD-10-CM

## 2025-01-27 DIAGNOSIS — Z72.0 TOBACCO ABUSE: ICD-10-CM

## 2025-01-27 DIAGNOSIS — E27.49: ICD-10-CM

## 2025-01-27 DIAGNOSIS — E10.3293 TYPE 1 DIABETES MELLITUS WITH MILD NONPROLIFERATIVE RETINOPATHY OF BOTH EYES WITHOUT MACULAR EDEMA: Primary | ICD-10-CM

## 2025-01-27 DIAGNOSIS — E10.39 TYPE 1 DIABETES MELLITUS WITH OTHER OPHTHALMIC COMPLICATION: ICD-10-CM

## 2025-01-27 PROBLEM — J01.90 ACUTE NON-RECURRENT SINUSITIS: Status: RESOLVED | Noted: 2025-01-15 | Resolved: 2025-01-27

## 2025-01-27 LAB — POC HEMOGLOBIN A1C: 8.5 % (ref 4.2–6.5)

## 2025-01-27 PROCEDURE — 99214 OFFICE O/P EST MOD 30 MIN: CPT | Performed by: INTERNAL MEDICINE

## 2025-01-27 PROCEDURE — 83036 HEMOGLOBIN GLYCOSYLATED A1C: CPT | Performed by: INTERNAL MEDICINE

## 2025-01-27 PROCEDURE — 3008F BODY MASS INDEX DOCD: CPT | Performed by: INTERNAL MEDICINE

## 2025-01-27 PROCEDURE — 3078F DIAST BP <80 MM HG: CPT | Performed by: INTERNAL MEDICINE

## 2025-01-27 PROCEDURE — 3074F SYST BP LT 130 MM HG: CPT | Performed by: INTERNAL MEDICINE

## 2025-01-27 RX ORDER — VARENICLINE TARTRATE 0.5 (11)-1
KIT ORAL
Qty: 53 EACH | Refills: 0 | Status: SHIPPED | OUTPATIENT
Start: 2025-01-27

## 2025-01-27 RX ORDER — INSULIN DEGLUDEC 100 U/ML
22 INJECTION, SOLUTION SUBCUTANEOUS EVERY MORNING
Qty: 30 ML | Refills: 3 | Status: SHIPPED | OUTPATIENT
Start: 2025-01-27 | End: 2026-07-26

## 2025-01-27 RX ORDER — INSULIN LISPRO 100 [IU]/ML
7 INJECTION, SOLUTION INTRAVENOUS; SUBCUTANEOUS
Qty: 30 ML | Refills: 3 | Status: SHIPPED | OUTPATIENT
Start: 2025-01-27 | End: 2026-08-21

## 2025-01-27 RX ORDER — PEN NEEDLE, DIABETIC 31 GX5/16"
NEEDLE, DISPOSABLE MISCELLANEOUS
Qty: 400 EACH | Refills: 3 | Status: SHIPPED | OUTPATIENT
Start: 2025-01-27

## 2025-01-27 RX ORDER — FLASH GLUCOSE SENSOR
KIT MISCELLANEOUS
Qty: 6 EACH | Refills: 3 | Status: SHIPPED | OUTPATIENT
Start: 2025-01-27

## 2025-01-27 RX ORDER — VARENICLINE TARTRATE 1 MG/1
1 TABLET, FILM COATED ORAL 2 TIMES DAILY
Qty: 60 TABLET | Refills: 2 | Status: SHIPPED | OUTPATIENT
Start: 2025-01-27 | End: 2025-04-27

## 2025-01-27 ASSESSMENT — ENCOUNTER SYMPTOMS
FEVER: 0
PALPITATIONS: 0
CHILLS: 0
ARTHRALGIAS: 0
SHORTNESS OF BREATH: 0
DIARRHEA: 0
WOUND: 0
ABDOMINAL PAIN: 0
WHEEZING: 0
COUGH: 0
NUMBNESS: 0
ABDOMINAL DISTENTION: 0
VOMITING: 0
NERVOUS/ANXIOUS: 0
CONSTIPATION: 0

## 2025-01-27 ASSESSMENT — PATIENT HEALTH QUESTIONNAIRE - PHQ9: 1. LITTLE INTEREST OR PLEASURE IN DOING THINGS: NOT AT ALL

## 2025-01-27 NOTE — PROGRESS NOTES
"CHIEF COMPLAINT  Diabetes    HISTORY OF PRESENT ILLNESS  Mitul Theodore is a 54 y.o. male presents today for follow up of Diabetes    HPI    Patient needs refills on medications.  States blood sugar control is at baseline for him.   He tends to have some hypoglycemia in early morning hours., therefore only doses his Humalog with dinner if his glucose is > 250.  He is a long-time smoker and is interested in medication to help with cessation.     REVIEW OF SYSTEMS  Review of Systems   Constitutional:  Negative for chills and fever.   Respiratory:  Negative for cough, shortness of breath and wheezing.    Cardiovascular:  Negative for chest pain, palpitations and leg swelling.   Gastrointestinal:  Negative for abdominal distention, abdominal pain, constipation, diarrhea and vomiting.   Musculoskeletal:  Negative for arthralgias and gait problem.   Skin:  Negative for rash and wound.   Neurological:  Negative for numbness.   Psychiatric/Behavioral:  The patient is not nervous/anxious.        ALLERGIES  Patient has no known allergies.    MEDICATIONS  Current Outpatient Medications   Medication Instructions    BD Ultra-Fine Li Pen Needle 32 gauge x 5/32\" needle USE 4 A DAY    FreeStyle Claudia 2 [facility] USE AS DIRECTED    FreeStyle Claudia 2 Sensor kit USE 1 EVERY 14 DAYS    HumaLOG KwikPen Insulin 7 Units, subcutaneous, 3 times daily (morning, midday, late afternoon), Take as directed per insulin instructions.    Tresiba FlexTouch U-100 22 Units, subcutaneous, Every morning    varenicline tartrate (CHANTIX CONTINUING MONTH BOX) 1 mg, oral, 2 times daily, Take with full glass of water.    varenicline tartrate (Chantix Starting Month Box) 0.5 mg (11)- 1 mg (42) tablet Take as directed per dose pack.       TOBACCO USE  Social History     Tobacco Use   Smoking Status Every Day    Current packs/day: 1.00    Types: Cigarettes   Smokeless Tobacco Never       DEPRESSION SCREEN  Over the past 2 weeks, how often have you been " "bothered by any of the following problems?  Little interest or pleasure in doing things: Not at all    SURGICAL HISTORY  No past surgical history on file.       OBJECTIVE    /71   Pulse 98   Temp 36.2 °C (97.2 °F)   Ht 1.803 m (5' 11\")   Wt 68.4 kg (150 lb 11.2 oz)   SpO2 99%   BMI 21.02 kg/m²    BMI: Estimated body mass index is 21.02 kg/m² as calculated from the following:    Height as of this encounter: 1.803 m (5' 11\").    Weight as of this encounter: 68.4 kg (150 lb 11.2 oz).    BP Readings from Last 3 Encounters:   01/27/25 114/71   10/24/24 86/61   05/10/24 106/74      Wt Readings from Last 3 Encounters:   01/27/25 68.4 kg (150 lb 11.2 oz)   10/24/24 69.7 kg (153 lb 9.6 oz)   05/10/24 69.4 kg (153 lb)        PHYSICAL EXAM  Physical Exam  Constitutional:       Appearance: Normal appearance.   HENT:      Head: Normocephalic and atraumatic.   Cardiovascular:      Rate and Rhythm: Normal rate and regular rhythm.      Pulses: Normal pulses.      Heart sounds: Normal heart sounds. No murmur heard.  Pulmonary:      Effort: Pulmonary effort is normal. No respiratory distress.      Breath sounds: Normal breath sounds. No wheezing.   Abdominal:      General: Bowel sounds are normal.      Palpations: Abdomen is soft.      Tenderness: There is no abdominal tenderness.   Musculoskeletal:      Right lower leg: No edema.      Left lower leg: No edema.   Skin:     General: Skin is warm and dry.   Neurological:      General: No focal deficit present.      Mental Status: He is alert and oriented to person, place, and time. Mental status is at baseline.   Psychiatric:         Mood and Affect: Mood normal.         Behavior: Behavior normal.         Thought Content: Thought content normal.         Judgment: Judgment normal.          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 - Primary    " "Overview     Diagnosed age 12         Relevant Orders    POCT glycosylated hemoglobin (Hb A1C) manually resulted (Completed)    Hyporeninemic hypoaldosteronism (Multi)    Overview     - monitor labs / electrolytes         Statin declined    Tobacco abuse    Relevant Medications    varenicline tartrate (Chantix Starting Month Box) 0.5 mg (11)- 1 mg (42) tablet    varenicline tartrate (Chantix Continuing Month Box) 1 mg tablet    Screening for lung cancer    Relevant Orders    CT lung screening low dose     Other Visit Diagnoses       Type 1 diabetes mellitus with other ophthalmic complication        Relevant Medications    BD Ultra-Fine Li Pen Needle 32 gauge x 5/32\" needle    FreeStyle Claudia 2 Sensor kit    Tresiba FlexTouch U-100 100 unit/mL (3 mL) injection    HumaLOG KwikPen Insulin 100 unit/mL injection    Other Relevant Orders    POCT glycosylated hemoglobin (Hb A1C) manually resulted (Completed)            DM1 - has had condition > 40 years.  He is generally good about managing on his own.  He is not interested in an insulin pump.  Continue current management.    - A1c is steady today at 8.5  - I recommend more fat/protein with dinner relative to carbs.  - BP is normal, does not tolerate ACE inhibitor / ARB  - statin declined.     Smoking cessation / tobacco abuse - discussed NRT v. Chantix.  Patient agrees to try Chantix.  Common side effects discussed.  Rx sent.  - CT lung cancer screening ordered.    I also recommend CT cardiac score - he declines at this time.    Advised to stay up to date with annual eye exam.     Follow-up 3 months.        "

## 2025-02-17 ENCOUNTER — APPOINTMENT (OUTPATIENT)
Dept: RADIOLOGY | Facility: CLINIC | Age: 55
End: 2025-02-17
Payer: COMMERCIAL

## 2025-02-19 DIAGNOSIS — Z72.0 TOBACCO ABUSE: ICD-10-CM

## 2025-02-19 RX ORDER — VARENICLINE TARTRATE 1 MG/1
1 TABLET, FILM COATED ORAL 2 TIMES DAILY
Qty: 180 TABLET | Refills: 1 | Status: SHIPPED | OUTPATIENT
Start: 2025-02-19 | End: 2025-08-18

## 2025-02-21 ENCOUNTER — HOSPITAL ENCOUNTER (OUTPATIENT)
Dept: RADIOLOGY | Facility: CLINIC | Age: 55
Discharge: HOME | End: 2025-02-21
Payer: COMMERCIAL

## 2025-02-21 DIAGNOSIS — Z12.2 SCREENING FOR LUNG CANCER: ICD-10-CM

## 2025-02-21 PROCEDURE — 71271 CT THORAX LUNG CANCER SCR C-: CPT

## 2025-02-23 DIAGNOSIS — Z72.0 TOBACCO ABUSE: Primary | ICD-10-CM

## 2025-02-23 DIAGNOSIS — K20.90 ESOPHAGITIS: ICD-10-CM

## 2025-05-07 ENCOUNTER — APPOINTMENT (OUTPATIENT)
Dept: PRIMARY CARE | Facility: CLINIC | Age: 55
End: 2025-05-07
Payer: COMMERCIAL

## 2025-05-07 VITALS
HEIGHT: 71 IN | SYSTOLIC BLOOD PRESSURE: 107 MMHG | WEIGHT: 148.3 LBS | DIASTOLIC BLOOD PRESSURE: 62 MMHG | TEMPERATURE: 97.2 F | BODY MASS INDEX: 20.76 KG/M2 | OXYGEN SATURATION: 97 % | HEART RATE: 95 BPM

## 2025-05-07 DIAGNOSIS — J43.2 CENTRILOBULAR EMPHYSEMA (MULTI): ICD-10-CM

## 2025-05-07 DIAGNOSIS — Z72.0 TOBACCO ABUSE: ICD-10-CM

## 2025-05-07 DIAGNOSIS — R93.1 ELEVATED CORONARY ARTERY CALCIUM SCORE: ICD-10-CM

## 2025-05-07 DIAGNOSIS — E10.3299 TYPE 1 DIABETES MELLITUS WITH MILD NONPROLIFERATIVE RETINOPATHY WITHOUT MACULAR EDEMA, UNSPECIFIED LATERALITY: Primary | Chronic | ICD-10-CM

## 2025-05-07 DIAGNOSIS — E27.49: Chronic | ICD-10-CM

## 2025-05-07 DIAGNOSIS — Z53.20 STATIN DECLINED: ICD-10-CM

## 2025-05-07 PROCEDURE — 3074F SYST BP LT 130 MM HG: CPT | Performed by: INTERNAL MEDICINE

## 2025-05-07 PROCEDURE — 3052F HG A1C>EQUAL 8.0%<EQUAL 9.0%: CPT | Performed by: INTERNAL MEDICINE

## 2025-05-07 PROCEDURE — 3008F BODY MASS INDEX DOCD: CPT | Performed by: INTERNAL MEDICINE

## 2025-05-07 PROCEDURE — 3078F DIAST BP <80 MM HG: CPT | Performed by: INTERNAL MEDICINE

## 2025-05-07 PROCEDURE — 99214 OFFICE O/P EST MOD 30 MIN: CPT | Performed by: INTERNAL MEDICINE

## 2025-05-07 ASSESSMENT — ENCOUNTER SYMPTOMS
COUGH: 0
WOUND: 0
VOMITING: 0
ARTHRALGIAS: 0
SHORTNESS OF BREATH: 0
NERVOUS/ANXIOUS: 0
WHEEZING: 0
CONSTIPATION: 0
CHILLS: 0
DIARRHEA: 0
NUMBNESS: 0
ABDOMINAL PAIN: 0
ABDOMINAL DISTENTION: 0
PALPITATIONS: 0
FEVER: 0

## 2025-05-07 ASSESSMENT — PATIENT HEALTH QUESTIONNAIRE - PHQ9
SUM OF ALL RESPONSES TO PHQ9 QUESTIONS 1 AND 2: 0
1. LITTLE INTEREST OR PLEASURE IN DOING THINGS: NOT AT ALL
2. FEELING DOWN, DEPRESSED OR HOPELESS: NOT AT ALL

## 2025-05-07 NOTE — PROGRESS NOTES
"CHIEF COMPLAINT  Diabetes    HISTORY OF PRESENT ILLNESS  Mitul Theodore is a 55 y.o. male presents today for follow up of Diabetes    HPI    Labile blood sugar - prone to hypoglycemia.  States if his blood sugar is not at least 250 before going to bed, he'll bottom out several times during night.  His prior PCP was endocrinologist, retired June 2023.  Patient declines referral to endocrinologist.  He states he will never do insulin pump.  His prior PCP was aware of his labile blood sugar and recommended a pump.     Doing well on Chantix.  Declines statin.     REVIEW OF SYSTEMS  Review of Systems   Constitutional:  Negative for chills and fever.   Respiratory:  Negative for cough, shortness of breath and wheezing.    Cardiovascular:  Negative for chest pain, palpitations and leg swelling.   Gastrointestinal:  Negative for abdominal distention, abdominal pain, constipation, diarrhea and vomiting.   Musculoskeletal:  Negative for arthralgias and gait problem.   Skin:  Negative for rash and wound.   Neurological:  Negative for numbness.   Psychiatric/Behavioral:  The patient is not nervous/anxious.        ALLERGIES  Patient has no known allergies.    MEDICATIONS  Current Outpatient Medications   Medication Instructions    BD Ultra-Fine Li Pen Needle 32 gauge x 5/32\" needle USE 4 A DAY    FreeStyle Claudia 2 [facility] USE AS DIRECTED    FreeStyle Claudia 2 Sensor kit USE 1 EVERY 14 DAYS    HumaLOG KwikPen Insulin 7 Units, subcutaneous, 3 times daily (morning, midday, late afternoon), Take as directed per insulin instructions.    Tresiba FlexTouch U-100 22 Units, subcutaneous, Every morning    varenicline tartrate (CHANTIX) 1 mg, oral, 2 times daily, Take with full glass of water.       TOBACCO USE  Tobacco Use History[1]    DEPRESSION SCREEN  Over the past 2 weeks, how often have you been bothered by any of the following problems?  Little interest or pleasure in doing things: Not at all  Feeling down, depressed, or " "hopeless: Not at all    SURGICAL HISTORY  No past surgical history on file.       OBJECTIVE    /62   Pulse 95   Temp 36.2 °C (97.2 °F)   Ht 1.803 m (5' 11\")   Wt 67.3 kg (148 lb 4.8 oz)   SpO2 97%   BMI 20.68 kg/m²    BMI: Estimated body mass index is 20.68 kg/m² as calculated from the following:    Height as of this encounter: 1.803 m (5' 11\").    Weight as of this encounter: 67.3 kg (148 lb 4.8 oz).    BP Readings from Last 3 Encounters:   05/07/25 107/62   01/27/25 114/71   10/24/24 86/61      Wt Readings from Last 3 Encounters:   05/07/25 67.3 kg (148 lb 4.8 oz)   01/27/25 68.4 kg (150 lb 11.2 oz)   10/24/24 69.7 kg (153 lb 9.6 oz)        PHYSICAL EXAM  Physical Exam  Constitutional:       Appearance: Normal appearance.   HENT:      Head: Normocephalic and atraumatic.   Cardiovascular:      Rate and Rhythm: Normal rate and regular rhythm.      Pulses: Normal pulses.      Heart sounds: Normal heart sounds. No murmur heard.  Pulmonary:      Effort: Pulmonary effort is normal. No respiratory distress.      Breath sounds: Normal breath sounds. No wheezing.   Musculoskeletal:      Right lower leg: No edema.      Left lower leg: No edema.   Skin:     Findings: No rash.   Neurological:      General: No focal deficit present.      Mental Status: He is alert and oriented to person, place, and time. Mental status is at baseline.   Psychiatric:         Mood and Affect: Mood normal.         Behavior: Behavior normal.         Thought Content: Thought content normal.         Judgment: Judgment normal.          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 - Primary    Overview   Diagnosed age 12         Relevant Orders    Hemoglobin A1c    Albumin-Creatinine Ratio, Urine Random    Hyporeninemic hypoaldosteronism (Multi)    Overview   - monitor labs / electrolytes         Relevant Orders    " Basic metabolic panel    Statin declined    Tobacco abuse    Centrilobular emphysema (Multi)    Elevated coronary artery calcium score    Overview   Per CT [facility] 2025          DM1 - has had condition > 40 years.  He is generally good about managing on his own.  He is not interested in an insulin pump.  Declines referral to endocrionlogist.  - Check A1c  - urine albumin/creatinine, BUN, Cr, GFR 25  - BP is normal, does not tolerate ACE inhibitor / ARB  - statin declined.  Patient is aware of elevated cardiac calcium score and that statin is indicated regardless of his cholesterol values.      Smoking cessation / tobacco abuse, emphysema - going well on Chantix, will continue up to 3 additional months.    Follow-up 3 months.                  [1]   Social History  Tobacco Use   Smoking Status Former    Current packs/day: 0.00    Types: Cigarettes    Quit date: 3/15/2025    Years since quittin.1   Smokeless Tobacco Never

## 2025-05-09 LAB
ALBUMIN/CREAT UR: NORMAL MG/G CREAT
ANION GAP SERPL CALCULATED.4IONS-SCNC: 8 MMOL/L (CALC) (ref 7–17)
BUN SERPL-MCNC: 17 MG/DL (ref 7–25)
BUN/CREAT SERPL: NORMAL (CALC) (ref 6–22)
CALCIUM SERPL-MCNC: 9.5 MG/DL (ref 8.6–10.3)
CHLORIDE SERPL-SCNC: 105 MMOL/L (ref 98–110)
CO2 SERPL-SCNC: 27 MMOL/L (ref 20–32)
CREAT SERPL-MCNC: 0.74 MG/DL (ref 0.7–1.3)
CREAT UR-MCNC: 127 MG/DL (ref 20–320)
EGFRCR SERPLBLD CKD-EPI 2021: 108 ML/MIN/1.73M2
EST. AVERAGE GLUCOSE BLD GHB EST-MCNC: 220 MG/DL
EST. AVERAGE GLUCOSE BLD GHB EST-SCNC: 12.2 MMOL/L
GLUCOSE SERPL-MCNC: 130 MG/DL (ref 65–139)
HBA1C MFR BLD: 9.3 %
MICROALBUMIN UR-MCNC: <0.2 MG/DL
POTASSIUM SERPL-SCNC: 5.3 MMOL/L (ref 3.5–5.3)
SODIUM SERPL-SCNC: 140 MMOL/L (ref 135–146)

## 2025-05-11 DIAGNOSIS — E10.3299 TYPE 1 DIABETES MELLITUS WITH MILD NONPROLIFERATIVE RETINOPATHY WITHOUT MACULAR EDEMA, UNSPECIFIED LATERALITY: Primary | ICD-10-CM

## 2025-07-30 DIAGNOSIS — E10.3299 TYPE 1 DIABETES MELLITUS WITH MILD NONPROLIFERATIVE RETINOPATHY WITHOUT MACULAR EDEMA, UNSPECIFIED LATERALITY: Primary | ICD-10-CM

## 2025-07-30 RX ORDER — BLOOD-GLUCOSE,RECEIVER,CONT
EACH MISCELLANEOUS
Qty: 1 EACH | Refills: 0 | Status: SHIPPED | OUTPATIENT
Start: 2025-07-30

## 2025-07-30 RX ORDER — BLOOD-GLUCOSE SENSOR
EACH MISCELLANEOUS
Qty: 3 EACH | Refills: 11 | Status: SHIPPED | OUTPATIENT
Start: 2025-07-30

## 2025-08-14 ENCOUNTER — APPOINTMENT (OUTPATIENT)
Dept: PRIMARY CARE | Facility: CLINIC | Age: 55
End: 2025-08-14
Payer: COMMERCIAL

## 2025-08-14 VITALS
OXYGEN SATURATION: 94 % | WEIGHT: 156 LBS | HEIGHT: 71 IN | SYSTOLIC BLOOD PRESSURE: 108 MMHG | HEART RATE: 91 BPM | BODY MASS INDEX: 21.84 KG/M2 | TEMPERATURE: 96.6 F | DIASTOLIC BLOOD PRESSURE: 68 MMHG

## 2025-08-14 DIAGNOSIS — E10.65 TYPE 1 DIABETES MELLITUS WITH HYPERGLYCEMIA (MULTI): ICD-10-CM

## 2025-08-14 DIAGNOSIS — Z72.0 TOBACCO ABUSE: ICD-10-CM

## 2025-08-14 DIAGNOSIS — Z53.20 STATIN DECLINED: ICD-10-CM

## 2025-08-14 DIAGNOSIS — E10.3299 TYPE 1 DIABETES MELLITUS WITH MILD NONPROLIFERATIVE RETINOPATHY WITHOUT MACULAR EDEMA, UNSPECIFIED LATERALITY: Primary | ICD-10-CM

## 2025-08-14 LAB — POC HEMOGLOBIN A1C: 9.2 % (ref 4.2–6.5)

## 2025-08-14 PROCEDURE — 99214 OFFICE O/P EST MOD 30 MIN: CPT | Performed by: INTERNAL MEDICINE

## 2025-08-14 PROCEDURE — 3008F BODY MASS INDEX DOCD: CPT | Performed by: INTERNAL MEDICINE

## 2025-08-14 PROCEDURE — 3074F SYST BP LT 130 MM HG: CPT | Performed by: INTERNAL MEDICINE

## 2025-08-14 PROCEDURE — 83036 HEMOGLOBIN GLYCOSYLATED A1C: CPT | Performed by: INTERNAL MEDICINE

## 2025-08-14 PROCEDURE — 3078F DIAST BP <80 MM HG: CPT | Performed by: INTERNAL MEDICINE

## 2025-08-14 PROCEDURE — 3046F HEMOGLOBIN A1C LEVEL >9.0%: CPT | Performed by: INTERNAL MEDICINE

## 2025-08-14 ASSESSMENT — ENCOUNTER SYMPTOMS
SPEECH DIFFICULTY: 0
NERVOUS/ANXIOUS: 0
SEIZURES: 0
SWEATS: 0
CONFUSION: 0
BLACKOUTS: 0
TREMORS: 0
HUNGER: 0
HEADACHES: 0
DIZZINESS: 0

## 2025-11-24 ENCOUNTER — APPOINTMENT (OUTPATIENT)
Dept: PRIMARY CARE | Facility: CLINIC | Age: 55
End: 2025-11-24
Payer: COMMERCIAL